# Patient Record
Sex: FEMALE
[De-identification: names, ages, dates, MRNs, and addresses within clinical notes are randomized per-mention and may not be internally consistent; named-entity substitution may affect disease eponyms.]

---

## 2024-09-25 PROBLEM — Z00.00 ENCOUNTER FOR PREVENTIVE HEALTH EXAMINATION: Status: ACTIVE | Noted: 2024-09-25

## 2024-09-30 ENCOUNTER — APPOINTMENT (OUTPATIENT)
Dept: COLORECTAL SURGERY | Facility: CLINIC | Age: 29
End: 2024-09-30
Payer: COMMERCIAL

## 2024-09-30 VITALS
HEART RATE: 88 BPM | BODY MASS INDEX: 24.8 KG/M2 | DIASTOLIC BLOOD PRESSURE: 61 MMHG | SYSTOLIC BLOOD PRESSURE: 92 MMHG | HEIGHT: 63 IN | WEIGHT: 140 LBS | OXYGEN SATURATION: 98 %

## 2024-09-30 DIAGNOSIS — K64.9 UNSPECIFIED HEMORRHOIDS: ICD-10-CM

## 2024-09-30 PROCEDURE — 46600 DIAGNOSTIC ANOSCOPY SPX: CPT

## 2024-09-30 PROCEDURE — 99204 OFFICE O/P NEW MOD 45 MIN: CPT | Mod: 25

## 2024-09-30 RX ORDER — HYDROCORTISONE 25 MG/G
2.5 CREAM TOPICAL
Qty: 1 | Refills: 3 | Status: ACTIVE | COMMUNITY
Start: 2024-09-30 | End: 1900-01-01

## 2024-09-30 NOTE — ASSESSMENT
[FreeTextEntry1] : Exam findings and diagnosis were discussed at length with patient.  Reports h/o anal fissure. However, current symptoms more consistent with hemorrhoid and no anal fissure seen on current exam, including anoscopy. Symptoms of anal fissure reviewed with patient, if occur, recommend patient follow up for repeat examination at that time.  Continue increased fiber intake, adequate daily hydration, and fiber supplement. Agree with Miralax as needed.  Avoid constipation and diarrhea, avoid pushing/straining. Recommend sitz baths after BMs and as needed for symptoms.  Perianal hygiene techniques reviewed.  Medical management, such hydrocortisone cream, was discussed as needed for symptoms. Rx provided.  If no improvement, discussed hemorrhoidectomy as an alternative. The nature of the procedure as well as risks and benefits were reviewed with the patient. The preoperative, intraoperative, and postoperative management were discussed with the patient in detail.  All questions answered, patient expressed understanding and is agreeable to this plan.

## 2024-09-30 NOTE — PHYSICAL EXAM
[FreeTextEntry1] : Medical assistant present for duration of physical examination   General no acute distress, alert and oriented Psych calm, pleasant demeanor, responding appropriately to questions Nonlabored breathing Ambulating without assistance Skin normal color and pigment, no visible lesions or rashes    Anorectal Exam: Inspection no erythema, induration or fluctuance, no skin excoriation, no fissure, right anterior external hemorrhoid without acute inflammation or thrombosis DANISH nontender, no masses palpated, no blood on gloved finger, normal tone at rest, no anal spasm   Procedure: Anoscopy   Pre procedure Diagnosis: hemorrhoid Post procedure Diagnosis: hemorrhoid Anesthesia: none Estimated blood loss: none Specimen: none Complications: none   Consent obtained. Anoscopy was performed by passing a lighted anoscope with lubricant jelly into the anal canal and the entire anal mucosal surface was inspected. Findings included no fissure, right anterior internal hemorrhoid, no visible masses or lesions in anal canal   Patient tolerated examination and procedure well.

## 2024-09-30 NOTE — HISTORY OF PRESENT ILLNESS
[FreeTextEntry1] : 38yo female presents for initial evaluation  Referred by GI Dr Jennifer Huntley  PMH anxiety, constipation Meds semaglutide for weight loss, Propanolol for anxiety, IUD; stopped taking escitalopram 1 month ago PSH breast augmentation (5 years ago)  She states her symptoms started 2 years ago. 2 years ago, she had sharp pain that felt like she was passing shards of glass. Saw a provider at that time and was prescribed a topical compound cream and did sizt baths and symptoms improved. Last used the topical compound 8 months ago. She states that as part of the healing process she formed either a skin tag or a hemorrhoid. Has not done any anorectal surgical procedures or had botox injection.  Most recently, she notes BRB (either on TP or in bowl) with most BMs.  If she sees BRB, she also notes a throbbing pain during and after BMs.  Denies sharp glass-like pain like she had in the past.  Reports occasional itching.  Not currently applying any creams to anal area.  States she saw a GI who diagnosed patient with "2 minor and 1 major" fissures and that one of the fissures was on a hemorrhoid. She was advised to take metamucil and miralax daily She has been taking Metamucil daily, but has not started taking Miralax because she feels the Metamucil has resolved her constipation issues.  Saw GI again, who per patient told her that the minor fissures healed but still has the major fissure, and patient was referred for evaluation.  Never had a colonoscopy.  BMs are daily, occasionally 2 times per day while taking Metamucil powder daily